# Patient Record
Sex: MALE | Race: WHITE | HISPANIC OR LATINO | Employment: OTHER | URBAN - METROPOLITAN AREA
[De-identification: names, ages, dates, MRNs, and addresses within clinical notes are randomized per-mention and may not be internally consistent; named-entity substitution may affect disease eponyms.]

---

## 2019-09-18 ENCOUNTER — OFFICE VISIT (OUTPATIENT)
Dept: MEDICAL GROUP | Facility: PHYSICIAN GROUP | Age: 34
End: 2019-09-18
Payer: OTHER GOVERNMENT

## 2019-09-18 ENCOUNTER — HOSPITAL ENCOUNTER (OUTPATIENT)
Dept: LAB | Facility: MEDICAL CENTER | Age: 34
End: 2019-09-18
Attending: FAMILY MEDICINE
Payer: OTHER GOVERNMENT

## 2019-09-18 VITALS
OXYGEN SATURATION: 98 % | HEIGHT: 69 IN | RESPIRATION RATE: 12 BRPM | WEIGHT: 194 LBS | BODY MASS INDEX: 28.73 KG/M2 | HEART RATE: 71 BPM | DIASTOLIC BLOOD PRESSURE: 54 MMHG | TEMPERATURE: 98.8 F | SYSTOLIC BLOOD PRESSURE: 110 MMHG

## 2019-09-18 DIAGNOSIS — E66.3 OVERWEIGHT (BMI 25.0-29.9): ICD-10-CM

## 2019-09-18 DIAGNOSIS — G89.29 CHRONIC BILATERAL LOW BACK PAIN WITHOUT SCIATICA: ICD-10-CM

## 2019-09-18 DIAGNOSIS — M54.50 CHRONIC BILATERAL LOW BACK PAIN WITHOUT SCIATICA: ICD-10-CM

## 2019-09-18 DIAGNOSIS — M25.561 CHRONIC PAIN OF BOTH KNEES: ICD-10-CM

## 2019-09-18 DIAGNOSIS — Z98.52 VASECTOMY STATUS: ICD-10-CM

## 2019-09-18 DIAGNOSIS — G89.29 CHRONIC PAIN OF BOTH KNEES: ICD-10-CM

## 2019-09-18 DIAGNOSIS — M25.562 CHRONIC PAIN OF BOTH KNEES: ICD-10-CM

## 2019-09-18 LAB
ALBUMIN SERPL BCP-MCNC: 4.6 G/DL (ref 3.2–4.9)
ALBUMIN/GLOB SERPL: 1.7 G/DL
ALP SERPL-CCNC: 68 U/L (ref 30–99)
ALT SERPL-CCNC: 13 U/L (ref 2–50)
ANION GAP SERPL CALC-SCNC: 6 MMOL/L (ref 0–11.9)
AST SERPL-CCNC: 15 U/L (ref 12–45)
BASOPHILS # BLD AUTO: 0.7 % (ref 0–1.8)
BASOPHILS # BLD: 0.04 K/UL (ref 0–0.12)
BILIRUB SERPL-MCNC: 0.8 MG/DL (ref 0.1–1.5)
BUN SERPL-MCNC: 9 MG/DL (ref 8–22)
CALCIUM SERPL-MCNC: 9.9 MG/DL (ref 8.5–10.5)
CHLORIDE SERPL-SCNC: 101 MMOL/L (ref 96–112)
CHOLEST SERPL-MCNC: 126 MG/DL (ref 100–199)
CO2 SERPL-SCNC: 30 MMOL/L (ref 20–33)
CREAT SERPL-MCNC: 1.01 MG/DL (ref 0.5–1.4)
EOSINOPHIL # BLD AUTO: 0.09 K/UL (ref 0–0.51)
EOSINOPHIL NFR BLD: 1.5 % (ref 0–6.9)
ERYTHROCYTE [DISTWIDTH] IN BLOOD BY AUTOMATED COUNT: 42.5 FL (ref 35.9–50)
FASTING STATUS PATIENT QL REPORTED: NORMAL
GLOBULIN SER CALC-MCNC: 2.7 G/DL (ref 1.9–3.5)
GLUCOSE SERPL-MCNC: 82 MG/DL (ref 65–99)
HCT VFR BLD AUTO: 49.8 % (ref 42–52)
HDLC SERPL-MCNC: 49 MG/DL
HGB BLD-MCNC: 17 G/DL (ref 14–18)
IMM GRANULOCYTES # BLD AUTO: 0.02 K/UL (ref 0–0.11)
IMM GRANULOCYTES NFR BLD AUTO: 0.3 % (ref 0–0.9)
LDLC SERPL CALC-MCNC: 64 MG/DL
LYMPHOCYTES # BLD AUTO: 1.73 K/UL (ref 1–4.8)
LYMPHOCYTES NFR BLD: 29.4 % (ref 22–41)
MCH RBC QN AUTO: 31.7 PG (ref 27–33)
MCHC RBC AUTO-ENTMCNC: 34.1 G/DL (ref 33.7–35.3)
MCV RBC AUTO: 92.9 FL (ref 81.4–97.8)
MONOCYTES # BLD AUTO: 0.51 K/UL (ref 0–0.85)
MONOCYTES NFR BLD AUTO: 8.7 % (ref 0–13.4)
NEUTROPHILS # BLD AUTO: 3.49 K/UL (ref 1.82–7.42)
NEUTROPHILS NFR BLD: 59.4 % (ref 44–72)
NRBC # BLD AUTO: 0 K/UL
NRBC BLD-RTO: 0 /100 WBC
PLATELET # BLD AUTO: 214 K/UL (ref 164–446)
PMV BLD AUTO: 11.7 FL (ref 9–12.9)
POTASSIUM SERPL-SCNC: 3.9 MMOL/L (ref 3.6–5.5)
PROT SERPL-MCNC: 7.3 G/DL (ref 6–8.2)
RBC # BLD AUTO: 5.36 M/UL (ref 4.7–6.1)
SODIUM SERPL-SCNC: 137 MMOL/L (ref 135–145)
TRIGL SERPL-MCNC: 63 MG/DL (ref 0–149)
WBC # BLD AUTO: 5.9 K/UL (ref 4.8–10.8)

## 2019-09-18 PROCEDURE — 80061 LIPID PANEL: CPT

## 2019-09-18 PROCEDURE — 36415 COLL VENOUS BLD VENIPUNCTURE: CPT

## 2019-09-18 PROCEDURE — 85025 COMPLETE CBC W/AUTO DIFF WBC: CPT

## 2019-09-18 PROCEDURE — 80053 COMPREHEN METABOLIC PANEL: CPT

## 2019-09-18 PROCEDURE — 99385 PREV VISIT NEW AGE 18-39: CPT | Performed by: FAMILY MEDICINE

## 2019-09-18 RX ORDER — DIAZEPAM 10 MG/1
10 TABLET ORAL EVERY 6 HOURS PRN
Qty: 1 TAB | Refills: 0 | Status: SHIPPED | OUTPATIENT
Start: 2019-09-18 | End: 2019-09-19

## 2019-09-18 SDOH — HEALTH STABILITY: MENTAL HEALTH: HOW MANY STANDARD DRINKS CONTAINING ALCOHOL DO YOU HAVE ON A TYPICAL DAY?: 5 OR 6

## 2019-09-18 SDOH — HEALTH STABILITY: MENTAL HEALTH: HOW OFTEN DO YOU HAVE 6 OR MORE DRINKS ON ONE OCCASION?: LESS THAN MONTHLY

## 2019-09-18 SDOH — HEALTH STABILITY: MENTAL HEALTH: HOW OFTEN DO YOU HAVE A DRINK CONTAINING ALCOHOL?: MONTHLY OR LESS

## 2019-09-18 NOTE — ASSESSMENT & PLAN NOTE
New problem to examiner.  Patient with bilateral knee pain and cracking/popping with activity.  Insidious onset over years however progressively worsening.  Extensive history of physical exertion given his career in the [employer].  Still has full range of motion, denies laxity, denies locking.  Denies radiation of pain.  Worse on climbing up or down hills.

## 2019-09-18 NOTE — ASSESSMENT & PLAN NOTE
Patient here for pre-vasectomy counseling.  Patient has 2 daughters and states that he does not want to accidentally have a third child content with 2 children at this point.  No history of STDs, recurrent UTIs or testicular/scrotal surgeries.

## 2019-09-18 NOTE — ASSESSMENT & PLAN NOTE
New problem to examiner.  Patient with bilateral low back pain dull and achy in nature worse after exertion or running.  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  Low back pain worse after heavy squats or running with heavy pack.  Denies any sciatica, fevers or chills, saddle anesthesia, bowel or bladder incontinence, gait abnormalities.

## 2019-09-18 NOTE — PROGRESS NOTES
CC:  Diagnoses of Overweight (BMI 25.0-29.9), Vasectomy status, Chronic pain of both knees, and Chronic bilateral low back pain without sciatica were pertinent to this visit.    HISTORY OF THE PRESENT ILLNESS: Patient is a 33 y.o. male. This pleasant patient is here today to establish new PCP and discuss vasectomy.    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      Chronic bilateral low back pain without sciatica  New problem to examiner.  Patient with bilateral low back pain dull and achy in nature worse after exertion or running.  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  Low back pain worse after heavy squats or running with heavy pack.  Denies any sciatica, fevers or chills, saddle anesthesia, bowel or bladder incontinence, gait abnormalities.    Bilateral knee pain  New problem to examiner.  Patient with bilateral knee pain and cracking/popping with activity.  Insidious onset over years however progressively worsening.  Extensive history of physical exertion given his career in the [email or web address].  Still has full range of motion, denies laxity, denies locking.  Denies radiation of pain.  Worse on climbing up or down hills.    Vasectomy status  Patient here for pre-vasectomy counseling.  Patient has 2 daughters and states that he does not want to accidentally have a third child content with 2 children at this point.  No history of STDs, recurrent UTIs or testicular/scrotal surgeries.    Allergies: Penicillins    Current Outpatient Medications Ordered in Epic   Medication Sig Dispense Refill   • diazepam (VALIUM) 10 MG tablet Take 1 Tab by mouth every 6 hours as needed (Prior to vasectomy) for up to 1 day. 1 Tab 0     No current Epic-ordered facility-administered  "medications on file.        History reviewed. No pertinent past medical history.    Past Surgical History:   Procedure Laterality Date   • EYE SURGERY      PRK       Social History     Tobacco Use   • Smoking status: Former Smoker     Packs/day: 3.00     Last attempt to quit: 2010     Years since quittin.0   • Smokeless tobacco: Never Used   Substance Use Topics   • Alcohol use: Yes     Frequency: Monthly or less     Drinks per session: 5 or 6     Binge frequency: Less than monthly   • Drug use: Never       Social History     Social History Narrative   • Not on file       History reviewed. No pertinent family history.    ROS:   Constitutional: No Fevers, Chills  Eyes: No eye pain  ENT: No sore throat  Resp: No Shortness of breath  CV: No Chest pain  GI: No Nausea/Vomiting  MSK: No weakness  Skin: No rashes  Neuro: No Headaches  Psych: No Suicidal ideations        Exam: /54   Pulse 71   Temp 37.1 °C (98.8 °F) (Temporal)   Resp 12   Ht 1.753 m (5' 9\")   Wt 88 kg (194 lb)   SpO2 98%  Body mass index is 28.65 kg/m².    GENERAL: No acute distress  HENT: Atraumatic, normocephalic  EYES: Extraocular movements intact, pupils equal and reactive to light  NECK: Supple, FROM  CHEST: No deformities, Equal chest expansion  RESP: Unlabored, no stridor or audible wheeze  ABD: Soft, Nontender, Non-Distended  : normal external male genitalia, uncircumcised.  Bilaterally descended testes with palpable vas deferens bilaterally.  Nontender on examination.  Extremities: No Clubbing, Cyanosis, or Edema.  Bilateral knees with crepitus on active range of motion.  No pain with passive range of motion.  Nontender with patellar mobilization.  Nontender joint line.  No laxity with valgus or varus stress.  Negative anterior and posterior drawers.  Skin: Warm/dry, without rases  Neuro: A/O x 4, CN 2-12 Grossly intact, Motor/sensory grosly intact  Psych: Normal behavior, normal affect      Lab review:  orders written for " new lab studies as appropriate; see orders    Assessment/Plan:  1. Overweight (BMI 25.0-29.9)  New problem to examiner.  Labs as below.  Elevated BMI likely secondary to lean muscle mass.  - CBC WITH DIFFERENTIAL; Future  - Comp Metabolic Panel; Future  - Lipid Profile; Future    2. Vasectomy status  Pre-vasectomy counseling discussed today.  Diazepam for preoperative sedation.  Risks, benefits, alternatives discussed.  Pre-vasectomy education, instructions, and consent signed today.  Vasectomy tentatively scheduled for 9/20/2019.  Patient demonstrates understanding of risks, benefits, and alternatives and still provides consent to proceed.  - diazepam (VALIUM) 10 MG tablet; Take 1 Tab by mouth every 6 hours as needed (Prior to vasectomy) for up to 1 day.  Dispense: 1 Tab; Refill: 0    3. Chronic pain of both knees  New problem to examiner.  Counseled on home physical therapy and NSAIDs, ice, rest,.    4. Chronic bilateral low back pain without sciatica  New problem to examiner.  Counseled on home physical therapy exercises, stretching, NSAIDs, ice, heat, and topical analgesics as needed for pain relief.  Follow-up for any red flag symptoms such as fever, chills, saddle anesthesia, incontinence, gait abnormalities or changes.      Please note that this dictation was created using voice recognition software. I have made every reasonable attempt to correct obvious errors, but I expect that there are errors of grammar and possibly content that I did not discover before finalizing the note.

## 2019-09-20 ENCOUNTER — HOSPITAL ENCOUNTER (OUTPATIENT)
Dept: LAB | Facility: MEDICAL CENTER | Age: 34
End: 2019-09-20
Attending: FAMILY MEDICINE
Payer: OTHER GOVERNMENT

## 2019-09-20 ENCOUNTER — OFFICE VISIT (OUTPATIENT)
Dept: MEDICAL GROUP | Facility: PHYSICIAN GROUP | Age: 34
End: 2019-09-20
Payer: OTHER GOVERNMENT

## 2019-09-20 VITALS
DIASTOLIC BLOOD PRESSURE: 68 MMHG | OXYGEN SATURATION: 97 % | SYSTOLIC BLOOD PRESSURE: 120 MMHG | BODY MASS INDEX: 28.14 KG/M2 | TEMPERATURE: 97.8 F | WEIGHT: 190 LBS | HEIGHT: 69 IN | HEART RATE: 82 BPM

## 2019-09-20 DIAGNOSIS — Z98.52 VASECTOMY STATUS: ICD-10-CM

## 2019-09-20 LAB — PATHOLOGY CONSULT NOTE: NORMAL

## 2019-09-20 PROCEDURE — 55250 REMOVAL OF SPERM DUCT(S): CPT | Mod: 50 | Performed by: FAMILY MEDICINE

## 2019-09-20 PROCEDURE — 88302 TISSUE EXAM BY PATHOLOGIST: CPT

## 2019-09-20 ASSESSMENT — PAIN SCALES - GENERAL: PAINLEVEL: NO PAIN

## 2019-09-20 NOTE — PROGRESS NOTES
Bilateral Vasectomy Operative Report    Preoperative Diagnosis: Desired Sterility and Vasectomy  Postoperative Diagnosis: Status Post Vasectomy  Procedure: Bilateral Vasectomy  Surgeon: Ford Hernandez MD  Patient's : Wife    Premedication: Valium 10 mg    Anesthesia: 2% lidocaine without epinephrine    Tolerance: Excellent    EBL: Approximately 1 cc    Complications: None    The patient was laid supine and the anterior scrotum was prepped and draped with Betadine x3 in the usual sterile manner.  A small 1 cm incision was made at the midline raphae using a 15 blade scalpel.  The right vas was identified, grasped, and approximated to the midline incision and 1 cc of 2% lidocaine without epinephrine was deposited around the vas in the upper scrotum using a 27-gauge 1.5 inch needle.  The right vas was then grasped with ringed vasectomy clamp.  Sharp dissecting forceps were used to dissect away the perivesical fascia until approximately 1.5 cm of vas deferens was visualized.  The vas was then transected on both sides using straight iris scissors and approximately 0.5 cm of vas deferens was placed in specimen container for pathologic review.  The proximal and distal ends of the vas were cauterized using Hyfrecator.  The proximal end was then allowed to retract below the fascial tissue plane and using 5-0 Vicryl on a P3 needle the 2 ends of the vas deferens were sewn into interposed fascial planes.  Good hemostasis was noted.  Attention was then turned to the left vas deferens.  The left vas deferens was isolated in the same fashion in the same technique was carried out as above.  The 1 cm incision in the midline raphae was closed with 5-0 Vicryl to be removed in 1 week.      After completion of the procedure, the scrotum was washed.  Good hemostasis was noted.  Patient dressed with supportive underwear.  Patient was given copy of postoperative instructions and will watch for signs of infection and report any  excessive pain or bleeding.  Guidelines for complete rest today with gradual increase in activity were reviewed.  Acetaminophen and ibuprofen are to be used for discomfort.  Ice is to be applied to the scrotum until the evening.  Patient was advised to refrain from heavy lifting, straining, running, jogging, and sexual activity for 1 week.  He is aware that sperm may be present in the ejaculate for up to 2 months.  Is emphasized to use contraception until negative sperm evaluation in 8 weeks or later show no sperm.  Containers will be given to the patient at 1 week follow-up.  Discussed with patient that 2- sperm samples are required to confirm sterility.  All questions answered prior to leaving office today.    Impression: Uncomplicated bilateral vasectomy.

## 2019-09-27 ENCOUNTER — OFFICE VISIT (OUTPATIENT)
Dept: MEDICAL GROUP | Facility: PHYSICIAN GROUP | Age: 34
End: 2019-09-27
Payer: OTHER GOVERNMENT

## 2019-09-27 VITALS
DIASTOLIC BLOOD PRESSURE: 58 MMHG | WEIGHT: 197 LBS | OXYGEN SATURATION: 95 % | TEMPERATURE: 98.1 F | HEART RATE: 77 BPM | HEIGHT: 69 IN | RESPIRATION RATE: 16 BRPM | SYSTOLIC BLOOD PRESSURE: 116 MMHG | BODY MASS INDEX: 29.18 KG/M2

## 2019-09-27 DIAGNOSIS — Z98.52 VASECTOMY STATUS: ICD-10-CM

## 2019-09-27 PROCEDURE — 99024 POSTOP FOLLOW-UP VISIT: CPT | Performed by: FAMILY MEDICINE

## 2019-09-28 NOTE — PROGRESS NOTES
"CC:The encounter diagnosis was Vasectomy status.      HISTORY OF PRESENT ILLNESS: Patient is a 33 y.o. male established patient who presents today to follow-up for postop wound check.    Vasectomy status  Currently status post vasectomy 1 week ago.  Pain is minimal however patient has noticed right-sided swelling with minimal pain and tenderness.  States the pain is worse when he sits down or attempts to cross his legs.  Patient has not begun running or lifting weights yet.  Patient has not resumed intercourse.  Denies fevers, chills, warmth, redness, or purulent discharge.      Patient Active Problem List    Diagnosis Date Noted   • Bilateral knee pain 2019   • Overweight (BMI 25.0-29.9) 2019   • Chronic bilateral low back pain without sciatica 2019   • Vasectomy status 2019      Allergies:Penicillins    No current outpatient medications on file.     No current facility-administered medications for this visit.        Social History     Tobacco Use   • Smoking status: Former Smoker     Packs/day: 3.00     Last attempt to quit: 2010     Years since quittin.0   • Smokeless tobacco: Never Used   Substance Use Topics   • Alcohol use: Yes     Frequency: Monthly or less     Drinks per session: 5 or 6     Binge frequency: Less than monthly   • Drug use: Never     Social History     Social History Narrative   • Not on file       No family history on file.    Review of Systems:    Constitutional: No Fevers, Chills  Eyes: No vision changes  ENT: No hearing changes  Resp: No Shortness of breath  CV: No Chest pain  GI: No Nausea/Vomiting  MSK: No weakness  Skin: No rashes  Neuro: No Headaches  Psych: No Suicidal ideations    All remaining systems reviewed and found to be negative, except as stated above.    Exam:    /58 (BP Location: Left arm, Patient Position: Sitting, BP Cuff Size: Adult)   Pulse 77   Temp 36.7 °C (98.1 °F) (Temporal)   Resp 16   Ht 1.753 m (5' 9\")   Wt 89.4 kg (197 " lb)   SpO2 95%  Body mass index is 29.09 kg/m².    General:  Well nourished, well developed male in NAD  HENT: Atraumatic, normocephalic  EYES: Extraocular movements intact, pupils equal and reactive to light  NECK: Supple, FROM  CHEST: No deformities, Equal chest expansion  RESP: Unlabored, no stridor or audible wheeze  ABD: Soft, Nontender, Non-Distended  .  Well-healing anterior scrotum with single suture removed with ease.  No erythema, fluctuance, ecchymosis, purulent discharge.  Right norris-spermatic cord hematoma present that is mildly tender to palpation approximately 2 cm x 1 cm in size.  Extremities: No Clubbing, Cyanosis, or Edema  Skin: Warm/dry, without rashes  Neuro: A/O x 4, CN 2-12 Grossly intact, Motor/sensory grossly intact  Psych: Normal behavior, normal affect    Assessment/Plan:  1. Vasectomy status  Counseled on need for 2 semen analyses demonstrating no sperm.  First semen analysis should be 2 months postop and after approximately 20 ejaculates.  Second semen analysis should be after another 20 ejaculates.  Follow-up for any worsening pain, erectile dysfunction, concerns of any kind.  - SEMEN ANALYSIS,POSTVASECTOMY; Future  - SEMEN ANALYSIS,POSTVASECTOMY; Future    Follow-up PRN    Please note that this dictation was created using voice recognition software. I have worked with consultants from the vendor as well as technical experts from [facility] [employer] to optimize the interface. I have made every reasonable attempt to correct obvious errors, but I expect that there are errors of grammar and possibly content that I did not discover before finalizing the note.

## 2019-09-28 NOTE — ASSESSMENT & PLAN NOTE
Currently status post vasectomy 1 week ago.  Pain is minimal however patient has noticed right-sided swelling with minimal pain and tenderness.  States the pain is worse when he sits down or attempts to cross his legs.  Patient has not begun running or lifting weights yet.  Patient has not resumed intercourse.  Denies fevers, chills, warmth, redness, or purulent discharge.

## 2020-09-08 ENCOUNTER — OFFICE VISIT (OUTPATIENT)
Dept: MEDICAL GROUP | Facility: PHYSICIAN GROUP | Age: 35
End: 2020-09-08
Payer: OTHER GOVERNMENT

## 2020-09-08 VITALS
BODY MASS INDEX: 30.21 KG/M2 | WEIGHT: 204 LBS | OXYGEN SATURATION: 99 % | DIASTOLIC BLOOD PRESSURE: 78 MMHG | HEART RATE: 76 BPM | RESPIRATION RATE: 16 BRPM | TEMPERATURE: 98 F | HEIGHT: 69 IN | SYSTOLIC BLOOD PRESSURE: 112 MMHG

## 2020-09-08 DIAGNOSIS — Z23 NEED FOR VACCINATION: ICD-10-CM

## 2020-09-08 DIAGNOSIS — Z00.00 WELL ADULT EXAM: ICD-10-CM

## 2020-09-08 PROCEDURE — 90715 TDAP VACCINE 7 YRS/> IM: CPT | Performed by: FAMILY MEDICINE

## 2020-09-08 PROCEDURE — 90471 IMMUNIZATION ADMIN: CPT | Performed by: FAMILY MEDICINE

## 2020-09-08 PROCEDURE — 99395 PREV VISIT EST AGE 18-39: CPT | Mod: 25 | Performed by: FAMILY MEDICINE

## 2020-09-08 ASSESSMENT — FIBROSIS 4 INDEX: FIB4 SCORE: 0.64

## 2020-09-08 ASSESSMENT — PATIENT HEALTH QUESTIONNAIRE - PHQ9: CLINICAL INTERPRETATION OF PHQ2 SCORE: 0

## 2020-09-08 NOTE — PROGRESS NOTES
CC:Diagnoses of Need for vaccination and Well adult exam were pertinent to this visit.    Ramiro Molina is a 34 y.o. male presents for a routine preventive health exam.    Health Maintenance: Completed    Screening/Preventative Topics:  Advanced directive: Not on file  Osteoporosis Screen/ DEXA: n/a   Diabetes Screening: Negative  AAA Screening: n/a  Aspirin Use: Not indicated  Diet: Excellent  Exercise: Regular and rigorous  Screen for depression: PHQ-2: 0   Substance Abuse: None  Safe in relationship   Sun protection used.    Cancer screening  Colorectal Cancer Screening: Not indicated  Lung Cancer Screening: Not indicated    Infectious disease screening  --STI Screening: Not indicated  --Practices safe sex.    Patient Active Problem List    Diagnosis Date Noted   • Bilateral knee pain 2019   • Overweight (BMI 25.0-29.9) 2019   • Chronic bilateral low back pain without sciatica 2019   • Vasectomy status 2019      Allergies:Penicillins    No current outpatient medications on file.     No current facility-administered medications for this visit.        Social History     Tobacco Use   • Smoking status: Former Smoker     Packs/day: 3.00     Quit date: 2010     Years since quittin.9   • Smokeless tobacco: Never Used   Substance Use Topics   • Alcohol use: Yes     Frequency: Monthly or less     Drinks per session: 5 or 6     Binge frequency: Less than monthly   • Drug use: Never     Social History     Social History Narrative   • Not on file       No family history on file.    Review of Systems:    Constitutional: No Fevers, Chills  Eyes: No vision changes  ENT: No hearing changes  Resp: No Shortness of breath  CV: No Chest pain  GI: No Nausea/Vomiting  MSK: No weakness  Skin: No rashes  Neuro: No Headaches  Psych: No Suicidal ideations    All remaining systems reviewed and found to be negative, except as stated above.    Exam:    /78 (BP Location: Right arm, Patient Position:  "Sitting, BP Cuff Size: Adult)   Pulse 76   Temp 36.7 °C (98 °F) (Temporal)   Resp 16   Ht 1.753 m (5' 9\")   Wt 92.5 kg (204 lb)   SpO2 99%  Body mass index is 30.13 kg/m².    General:  Well nourished, well developed male in NAD  HENT: Atraumatic, normocephalic  EYES: Extraocular movements intact, pupils equal and reactive to light  NECK: Supple, FROM  CHEST: No deformities, Equal chest expansion  RESP: Unlabored, no stridor or audible wheeze  ABD: Soft, Nontender, Non-Distended  Extremities: No Clubbing, Cyanosis, or Edema  Skin: Warm/dry, without rashes  Neuro: A/O x 4, CN 2-12 Grossly intact, Motor/sensory grossly intact  Psych: Normal behavior, normal affect    LABS: 9/18/2019: Results reviewed and discussed with the patient, questions answered.      Assessment/Plan:  1. Need for vaccination  2. Well adult exam  Well adult male with no medical concerns.  History of vasectomy and PRK but otherwise no abdominal or other surgeries.  Paperwork completed today for  medical clearance (please see scanned documents in media).  - Tdap Vaccine =>6YO IM    Patient up-to-date on preventative health maintenance examinations and vaccinations.  Age-appropriate anticipatory guidance provided today.    Preventive visit in 1 year, sooner as needed for any concerns.     Please note that this dictation was created using voice recognition software. I have worked with consultants from the vendor as well as technical experts from [employer] to optimize the interface. I have made every reasonable attempt to correct obvious errors, but I expect that there are errors of grammar and possibly content that I did not discover before finalizing the note.  "